# Patient Record
Sex: MALE | Race: WHITE | NOT HISPANIC OR LATINO | Employment: FULL TIME | URBAN - NONMETROPOLITAN AREA
[De-identification: names, ages, dates, MRNs, and addresses within clinical notes are randomized per-mention and may not be internally consistent; named-entity substitution may affect disease eponyms.]

---

## 2019-06-23 ENCOUNTER — HOSPITAL ENCOUNTER (EMERGENCY)
Facility: HOSPITAL | Age: 69
Discharge: HOME | End: 2019-06-23
Attending: EMERGENCY MEDICINE | Admitting: EMERGENCY MEDICINE

## 2019-06-23 VITALS
OXYGEN SATURATION: 94 % | DIASTOLIC BLOOD PRESSURE: 81 MMHG | WEIGHT: 188 LBS | HEIGHT: 65 IN | SYSTOLIC BLOOD PRESSURE: 132 MMHG | HEART RATE: 91 BPM | TEMPERATURE: 98.1 F | BODY MASS INDEX: 31.32 KG/M2 | RESPIRATION RATE: 16 BRPM

## 2019-06-23 DIAGNOSIS — T83.9XXA FOLEY CATHETER PROBLEM, INITIAL ENCOUNTER (HCC): Primary | ICD-10-CM

## 2019-06-23 PROCEDURE — 99283 EMERGENCY DEPT VISIT LOW MDM: CPT

## 2024-06-11 NOTE — PROGRESS NOTES
Subjective    Mr. Mays is 73 y.o. male    Chief Complaint: Elevated PSA    History of Present Illness  73-year-old male new patient previously seen in the past by Dr. Dukes in [place] and Dr. Felipe in [place] for enlarged prostate, history of what sounds like urethral stricture for which she had dilation in the office by Dr. Felipe approximately 4 to 5 years ago by his report, referred for elevated PSA level 4.3 adjusted to 8.6 on finasteride.  This is increased from previous level in the 2 range in 2021.  He denies gross hematuria, decreased force of stream, or flank pain.  He denies ED.  Patient states that his PSA last month was drawn around the time he was having dysuria and notes this has happened in the past when he had signs and symptoms of inflammation where his PSA went up and then went back down.    PSA  Date  4.3  5-29-24  2.5  11-12-21   2.2  3-12-21    The following portions of the patient's history were reviewed and updated as appropriate: allergies, current medications, past family history, past medical history, past social history, past surgical history and problem list.    Review of Systems      Current Outpatient Medications:     cetirizine (ZyrTEC Allergy) 10 MG tablet, , Disp: , Rfl:     Cozaar 100 MG tablet, Take 1 tablet by mouth Daily., Disp: , Rfl:     finasteride (PROSCAR) 5 MG tablet, , Disp: , Rfl:     nabumetone (Relafen) 750 MG tablet, Take 1 tablet by mouth Daily., Disp: , Rfl:     Norvasc 10 MG tablet, Take 1 tablet by mouth Daily., Disp: , Rfl:     silodosin (RAPAFLO) 8 MG capsule capsule, , Disp: , Rfl:     Past Medical History:   Diagnosis Date    Arthritis     Hypertension        History reviewed. No pertinent surgical history.    Social History     Socioeconomic History    Marital status:    Tobacco Use    Smoking status: Never     Passive exposure: Never    Smokeless tobacco: Never   Vaping Use    Vaping status: Never Used   Substance and Sexual Activity     "Alcohol use: Never    Drug use: Never    Sexual activity: Defer       Family History   Problem Relation Age of Onset    No Known Problems Father     No Known Problems Mother        Objective    Temp 98.2 °F (36.8 °C)   Ht 167.6 cm (66\")   Wt 81.6 kg (180 lb)   BMI 29.05 kg/m²     Physical Exam  Penis and testicles normal.  BLANCA with 25 g prostate with firm nodule on the right.      Results for orders placed or performed in visit on 06/17/24   POC Urinalysis Dipstick, Multipro    Specimen: Urine   Result Value Ref Range    Color Yellow Yellow, Straw, Dark Yellow, Itzel    Clarity, UA Clear Clear    Glucose, UA Negative Negative mg/dL    Bilirubin Negative Negative    Ketones, UA Negative Negative    Specific Gravity  1.025 1.005 - 1.030    Blood, UA Negative Negative    pH, Urine 6.0 5.0 - 8.0    Protein, POC Negative Negative mg/dL    Urobilinogen, UA Normal Normal, 0.2 E.U./dL    Nitrite, UA Negative Negative    Leukocytes Negative Negative     Assessment and Plan    Diagnoses and all orders for this visit:    1. Elevated prostate specific antigen (PSA) (Primary)  -     POC Urinalysis Dipstick, Multipro  -     PSA DIAGNOSTIC; Future    2. Prostate nodule      We had a long discussion regarding the natural history of elevated PSA along with potential causes including possible prostate cancer.  We also discussed further evaluation of his prostate nodule on exam including prostate biopsy versus prostate MRI.  Patient would like to have repeat PSA done prior to a follow-up appoint with me in the next 1 to 2 months prior to making a decision for prostate biopsy.        This document has been signed by RAFAEL River MD on June 17, 2024 15:33 CDT            "

## 2024-06-17 ENCOUNTER — OFFICE VISIT (OUTPATIENT)
Dept: UROLOGY | Facility: CLINIC | Age: 74
End: 2024-06-17
Payer: MEDICARE

## 2024-06-17 VITALS — BODY MASS INDEX: 28.93 KG/M2 | HEIGHT: 66 IN | TEMPERATURE: 98.2 F | WEIGHT: 180 LBS

## 2024-06-17 DIAGNOSIS — R97.20 ELEVATED PROSTATE SPECIFIC ANTIGEN (PSA): Primary | ICD-10-CM

## 2024-06-17 DIAGNOSIS — N40.2 PROSTATE NODULE: ICD-10-CM

## 2024-06-17 LAB
BILIRUB BLD-MCNC: NEGATIVE MG/DL
CLARITY, POC: CLEAR
COLOR UR: YELLOW
GLUCOSE UR STRIP-MCNC: NEGATIVE MG/DL
KETONES UR QL: NEGATIVE
LEUKOCYTE EST, POC: NEGATIVE
NITRITE UR-MCNC: NEGATIVE MG/ML
PH UR: 6 [PH] (ref 5–8)
PROT UR STRIP-MCNC: NEGATIVE MG/DL
RBC # UR STRIP: NEGATIVE /UL
SP GR UR: 1.02 (ref 1–1.03)
UROBILINOGEN UR QL: NORMAL

## 2024-06-17 PROCEDURE — 1160F RVW MEDS BY RX/DR IN RCRD: CPT | Performed by: UROLOGY

## 2024-06-17 PROCEDURE — 1159F MED LIST DOCD IN RCRD: CPT | Performed by: UROLOGY

## 2024-06-17 PROCEDURE — 99203 OFFICE O/P NEW LOW 30 MIN: CPT | Performed by: UROLOGY

## 2024-06-17 PROCEDURE — 81001 URINALYSIS AUTO W/SCOPE: CPT | Performed by: UROLOGY

## 2024-06-17 RX ORDER — AMLODIPINE BESYLATE 10 MG/1
1 TABLET ORAL DAILY
COMMUNITY
Start: 2024-06-13

## 2024-06-17 RX ORDER — FINASTERIDE 5 MG/1
TABLET, FILM COATED ORAL
COMMUNITY
Start: 2024-06-13

## 2024-06-17 RX ORDER — SILODOSIN 8 MG/1
CAPSULE ORAL
COMMUNITY
Start: 2024-04-17

## 2024-06-17 RX ORDER — CETIRIZINE HYDROCHLORIDE 10 MG/1
TABLET ORAL
COMMUNITY
Start: 2024-06-13

## 2024-06-17 RX ORDER — NABUMETONE 750 MG/1
1 TABLET, FILM COATED ORAL DAILY
COMMUNITY
Start: 2024-06-13

## 2024-06-17 RX ORDER — LOSARTAN POTASSIUM 100 MG
1 TABLET ORAL DAILY
COMMUNITY

## 2024-06-17 NOTE — LETTER
June 17, 2024     Edgar Hamlin DO  [address] [facility] [place] [address]    Patient: Prashant Mays   YOB: 1950   Date of Visit: 6/17/2024     Dear Dr. Boubacar DO:    Thank you for referring Prashant Mays to me for evaluation. Below are the relevant portions of my assessment and plan of care.    If you have questions, please do not hesitate to call me. I look forward to following Prashant along with you.         Sincerely,        Zeferino River MD        CC: No Recipients      Progress Notes:  Subjective    Mr. Mays is 73 y.o. male    Chief Complaint: Elevated PSA    History of Present Illness  73-year-old male new patient previously seen in the past by Dr. Dukes in [place] and Dr. Felipe in [place] for enlarged prostate, history of what sounds like urethral stricture for which she had dilation in the office by Dr. Felipe approximately 4 to 5 years ago by his report, referred for elevated PSA level 4.3 adjusted to 8.6 on finasteride.  This is increased from previous level in the 2 range in 2021.  He denies gross hematuria, decreased force of stream, or flank pain.  He denies ED.  Patient states that his PSA last month was drawn around the time he was having dysuria and notes this has happened in the past when he had signs and symptoms of inflammation where his PSA went up and then went back down.    PSA  Date  4.3  5-29-24  2.5  11-12-21   2.2  3-12-21    The following portions of the patient's history were reviewed and updated as appropriate: allergies, current medications, past family history, past medical history, past social history, past surgical history and problem list.    Review of Systems      Current Outpatient Medications:   •  cetirizine (ZyrTEC Allergy) 10 MG tablet, , Disp: , Rfl:   •  Cozaar 100 MG tablet, Take 1 tablet by mouth Daily., Disp: , Rfl:   •  finasteride (PROSCAR) 5 MG tablet, , Disp: , Rfl:   •  nabumetone (Relafen) 750 MG tablet, Take 1 tablet by mouth  "Daily., Disp: , Rfl:   •  Norvasc 10 MG tablet, Take 1 tablet by mouth Daily., Disp: , Rfl:   •  silodosin (RAPAFLO) 8 MG capsule capsule, , Disp: , Rfl:     Past Medical History:   Diagnosis Date   • Arthritis    • Hypertension        History reviewed. No pertinent surgical history.    Social History     Socioeconomic History   • Marital status:    Tobacco Use   • Smoking status: Never     Passive exposure: Never   • Smokeless tobacco: Never   Vaping Use   • Vaping status: Never Used   Substance and Sexual Activity   • Alcohol use: Never   • Drug use: Never   • Sexual activity: Defer       Family History   Problem Relation Age of Onset   • No Known Problems Father    • No Known Problems Mother        Objective    Temp 98.2 °F (36.8 °C)   Ht 167.6 cm (66\")   Wt 81.6 kg (180 lb)   BMI 29.05 kg/m²     Physical Exam  Penis and testicles normal.  BLANCA with 25 g prostate with firm nodule on the right.      Results for orders placed or performed in visit on 06/17/24   POC Urinalysis Dipstick, Multipro    Specimen: Urine   Result Value Ref Range    Color Yellow Yellow, Straw, Dark Yellow, Itzel    Clarity, UA Clear Clear    Glucose, UA Negative Negative mg/dL    Bilirubin Negative Negative    Ketones, UA Negative Negative    Specific Gravity  1.025 1.005 - 1.030    Blood, UA Negative Negative    pH, Urine 6.0 5.0 - 8.0    Protein, POC Negative Negative mg/dL    Urobilinogen, UA Normal Normal, 0.2 E.U./dL    Nitrite, UA Negative Negative    Leukocytes Negative Negative     Assessment and Plan    Diagnoses and all orders for this visit:    1. Elevated prostate specific antigen (PSA) (Primary)  -     POC Urinalysis Dipstick, Multipro  -     PSA DIAGNOSTIC; Future    2. Prostate nodule      We had a long discussion regarding the natural history of elevated PSA along with potential causes including possible prostate cancer.  We also discussed further evaluation of his prostate nodule on exam including prostate biopsy " versus prostate MRI.  Patient would like to have repeat PSA done prior to a follow-up appoint with me in the next 1 to 2 months prior to making a decision for prostate biopsy.        This document has been signed by RAFAEL River MD on June 17, 2024 15:33 CDT

## 2024-07-24 NOTE — PROGRESS NOTES
Subjective    Mr. Mays is 73 y.o. male    Chief Complaint: Elevated PSA    History of Present Illness    73-year-old male established patient follow-up for history of elevated PSA.  His repeat PSA this month is followed back down to 2.8 from 4.3 in May.  He was previously seen in the past by Dr. Dukes in [place] and Dr. Felipe in [place] for enlarged prostate, history of what sounds like urethral stricture for which she had dilation in the office by Dr. Felipe approximately 4 to 5 years ago by his report.   He denies gross hematuria, decreased force of stream, or flank pain.  He denies ED.     PSA                 Date  2.8  7-23-24   4.3                   5-29-24  2.5                   11-12-21            2.2                   3-12-21         The following portions of the patient's history were reviewed and updated as appropriate: allergies, current medications, past family history, past medical history, past social history, past surgical history and problem list.    Review of Systems      Current Outpatient Medications:     cetirizine (ZyrTEC Allergy) 10 MG tablet, , Disp: , Rfl:     Cozaar 100 MG tablet, Take 1 tablet by mouth Daily., Disp: , Rfl:     finasteride (PROSCAR) 5 MG tablet, , Disp: , Rfl:     nabumetone (Relafen) 750 MG tablet, Take 1 tablet by mouth Daily., Disp: , Rfl:     Norvasc 10 MG tablet, Take 1 tablet by mouth Daily., Disp: , Rfl:     silodosin (RAPAFLO) 8 MG capsule capsule, , Disp: , Rfl:     Past Medical History:   Diagnosis Date    Arthritis     Hypertension        History reviewed. No pertinent surgical history.    Social History     Socioeconomic History    Marital status:    Tobacco Use    Smoking status: Never     Passive exposure: Never    Smokeless tobacco: Never   Vaping Use    Vaping status: Never Used   Substance and Sexual Activity    Alcohol use: Never    Drug use: Never    Sexual activity: Defer       Family History   Problem Relation Age of Onset    No Known  "Problems Father     No Known Problems Mother        Objective    Temp 98.2 °F (36.8 °C)   Ht 167.6 cm (66\")   Wt 81.6 kg (180 lb)   BMI 29.05 kg/m²     Physical Exam        Results for orders placed or performed in visit on 07/29/24   POC Urinalysis Dipstick, Multipro    Specimen: Urine   Result Value Ref Range    Color Yellow Yellow, Straw, Dark Yellow, Itzel    Clarity, UA Clear Clear    Glucose, UA Negative Negative mg/dL    Bilirubin Negative Negative    Ketones, UA Negative Negative    Specific Gravity  1.020 1.005 - 1.030    Blood, UA Negative Negative    pH, Urine 6.5 5.0 - 8.0    Protein, POC Negative Negative mg/dL    Urobilinogen, UA Normal Normal, 0.2 E.U./dL    Nitrite, UA Negative Negative    Leukocytes Negative Negative     Assessment and Plan    Diagnoses and all orders for this visit:    1. Elevated prostate specific antigen (PSA) (Primary)  -     POC Urinalysis Dipstick, Multipro      Mildly elevated PSA stable from previous levels, commensurate with his age and history of enlarged prostate adjusted to 5.6 for finasteride effect.  He does not want a prostate biopsy or Prost MRI.  I recommended consideration of cessation of PSA screening given his age and PSA stability.  He would like to follow-up with me in 6 months in my [place] clinic.  Continue finasteride and silodosin.        This document has been signed by RAFAEL River MD on July 29, 2024 13:36 CDT              "

## 2024-07-29 ENCOUNTER — OFFICE VISIT (OUTPATIENT)
Dept: UROLOGY | Facility: CLINIC | Age: 74
End: 2024-07-29
Payer: MEDICARE

## 2024-07-29 VITALS — HEIGHT: 66 IN | BODY MASS INDEX: 28.93 KG/M2 | TEMPERATURE: 98.2 F | WEIGHT: 180 LBS

## 2024-07-29 DIAGNOSIS — R97.20 ELEVATED PROSTATE SPECIFIC ANTIGEN (PSA): Primary | ICD-10-CM

## 2024-07-29 LAB
BILIRUB BLD-MCNC: NEGATIVE MG/DL
CLARITY, POC: CLEAR
COLOR UR: YELLOW
GLUCOSE UR STRIP-MCNC: NEGATIVE MG/DL
KETONES UR QL: NEGATIVE
LEUKOCYTE EST, POC: NEGATIVE
NITRITE UR-MCNC: NEGATIVE MG/ML
PH UR: 6.5 [PH] (ref 5–8)
PROT UR STRIP-MCNC: NEGATIVE MG/DL
RBC # UR STRIP: NEGATIVE /UL
SP GR UR: 1.02 (ref 1–1.03)
UROBILINOGEN UR QL: NORMAL

## 2024-07-29 PROCEDURE — 1160F RVW MEDS BY RX/DR IN RCRD: CPT | Performed by: UROLOGY

## 2024-07-29 PROCEDURE — 1159F MED LIST DOCD IN RCRD: CPT | Performed by: UROLOGY

## 2024-07-29 PROCEDURE — 81001 URINALYSIS AUTO W/SCOPE: CPT | Performed by: UROLOGY

## 2024-07-29 PROCEDURE — 99213 OFFICE O/P EST LOW 20 MIN: CPT | Performed by: UROLOGY

## 2025-01-02 ENCOUNTER — TELEPHONE (OUTPATIENT)
Dept: UROLOGY | Facility: CLINIC | Age: 75
End: 2025-01-02
Payer: MEDICARE

## 2025-01-02 NOTE — TELEPHONE ENCOUNTER
Called patient because he scheduled to see Dr. River in [place] on 1/27/25, and due to changes in Dr. River' scheduled, and the integration of the new APRN, we wanted to reschedule his appointment so he may see the APRN instead.  Patient asked if we could call back the next day to schedule.  He says his wife has an appointment around that time, and he needed to check with her to see when it was before scheduling.  I told him I would call him tomorrow.  Patient verbalized agreement.

## 2025-01-03 NOTE — TELEPHONE ENCOUNTER
Called patient back and rescheduled his appointment to 1/27/25 at 3:15pm with Hanna Hudson in [place].  Patient verbally agreed to new time.

## 2025-01-13 NOTE — PROGRESS NOTES
Subjective    Mr. Mays is 74 y.o. male    Chief Complaint: Elevated PSA    History of Present Illness     74-year-old male established patient follow-up for history of elevated PSA and enlarged prostate. After his last appointment his PSA was down to 2.8, adjusted to 5.6 for finasteride effect and Dr. River elected to stop annual PSA checks as patient was not interested in MRI or biopsy.  His LUTS are currently well controlled on Finasteride and Silodosin. He was previously seen in the past by Dr. Dukes in [place] and Dr. Felipe in [place] for enlarged prostate, history of what sounds like urethral stricture for which she had dilation in the office by Dr. Felipe approximately 4 to 5 years ago by his report.  He denies gross hematuria, decreased force of stream, dysuria, or flank pain.       The following portions of the patient's history were reviewed and updated as appropriate: allergies, current medications, past family history, past medical history, past social history, past surgical history and problem list.    Review of Systems      Current Outpatient Medications:     cetirizine (ZyrTEC Allergy) 10 MG tablet, , Disp: , Rfl:     Cozaar 100 MG tablet, Take 1 tablet by mouth Daily., Disp: , Rfl:     finasteride (PROSCAR) 5 MG tablet, , Disp: , Rfl:     nabumetone (Relafen) 750 MG tablet, Take 1 tablet by mouth Daily., Disp: , Rfl:     Norvasc 10 MG tablet, Take 1 tablet by mouth Daily., Disp: , Rfl:     silodosin (RAPAFLO) 8 MG capsule capsule, , Disp: , Rfl:     Past Medical History:   Diagnosis Date    Arthritis     Hypertension        History reviewed. No pertinent surgical history.    Social History     Socioeconomic History    Marital status:    Tobacco Use    Smoking status: Never     Passive exposure: Never    Smokeless tobacco: Never   Vaping Use    Vaping status: Never Used   Substance and Sexual Activity    Alcohol use: Never    Drug use: Never    Sexual activity: Defer       Family  "History   Problem Relation Age of Onset    No Known Problems Father     No Known Problems Mother        Objective    Temp 97.7 °F (36.5 °C) (Infrared)   Ht 167.6 cm (66\")   Wt 79.8 kg (176 lb)   BMI 28.41 kg/m²     Physical Exam    Constitutional:No apparent distress; vitals reviewed as above  Psychiatric: Appropriate affect; alert and oriented  Musculoskeletal: Normal gait and station  Respiratory: No distress; unlabored movement; no accessory musculature needed with symmetric movements  Skin: No pallor or diaphoresis      Results for orders placed or performed in visit on 01/27/25   POC Urinalysis Dipstick, Multipro    Collection Time: 01/27/25  2:56 PM    Specimen: Urine   Result Value Ref Range    Color Yellow Yellow, Straw, Dark Yellow, Itzel    Clarity, UA Clear Clear    Glucose, UA Negative Negative mg/dL    Bilirubin Negative Negative    Ketones, UA Negative Negative    Specific Gravity  1.020 1.005 - 1.030    Blood, UA Negative Negative    pH, Urine 7.0 5.0 - 8.0    Protein, POC Negative Negative mg/dL    Urobilinogen, UA 0.2 E.U./dL Normal, 0.2 E.U./dL    Nitrite, UA Negative Negative    Leukocytes Negative Negative        Assessment and Plan    Diagnoses and all orders for this visit:    1. Elevated prostate specific antigen (PSA) (Primary)  -     POC Urinalysis Dipstick, Multipro    2. Enlarged prostate    Patient LUTS currently well controlled on dual therapy of Finasteride and Silodosin. He will continue Finasteride and Silodosin. Recommend no further PSA checks. He will follow up in 1 year or sooner if needed.             "

## 2025-01-27 ENCOUNTER — OFFICE VISIT (OUTPATIENT)
Dept: UROLOGY | Facility: CLINIC | Age: 75
End: 2025-01-27
Payer: MEDICARE

## 2025-01-27 VITALS — WEIGHT: 176 LBS | TEMPERATURE: 97.7 F | BODY MASS INDEX: 28.28 KG/M2 | HEIGHT: 66 IN

## 2025-01-27 DIAGNOSIS — N40.0 ENLARGED PROSTATE: ICD-10-CM

## 2025-01-27 DIAGNOSIS — R97.20 ELEVATED PROSTATE SPECIFIC ANTIGEN (PSA): Primary | ICD-10-CM

## 2025-01-27 LAB
BILIRUB BLD-MCNC: NEGATIVE MG/DL
CLARITY, POC: CLEAR
COLOR UR: YELLOW
GLUCOSE UR STRIP-MCNC: NEGATIVE MG/DL
KETONES UR QL: NEGATIVE
LEUKOCYTE EST, POC: NEGATIVE
NITRITE UR-MCNC: NEGATIVE MG/ML
PH UR: 7 [PH] (ref 5–8)
PROT UR STRIP-MCNC: NEGATIVE MG/DL
RBC # UR STRIP: NEGATIVE /UL
SP GR UR: 1.02 (ref 1–1.03)
UROBILINOGEN UR QL: NORMAL

## 2025-01-27 PROCEDURE — 1160F RVW MEDS BY RX/DR IN RCRD: CPT

## 2025-01-27 PROCEDURE — 99213 OFFICE O/P EST LOW 20 MIN: CPT

## 2025-01-27 PROCEDURE — 81001 URINALYSIS AUTO W/SCOPE: CPT

## 2025-01-27 PROCEDURE — 1159F MED LIST DOCD IN RCRD: CPT
